# Patient Record
Sex: MALE | Race: WHITE | Employment: OTHER | URBAN - METROPOLITAN AREA
[De-identification: names, ages, dates, MRNs, and addresses within clinical notes are randomized per-mention and may not be internally consistent; named-entity substitution may affect disease eponyms.]

---

## 2019-03-01 ENCOUNTER — HOSPITAL ENCOUNTER (OUTPATIENT)
Dept: LAB | Age: 59
Discharge: HOME | End: 2019-03-01

## 2019-03-01 LAB — SENTARA SPECIMEN COL,SENBCF: NORMAL

## 2019-03-01 PROCEDURE — 99001 SPECIMEN HANDLING PT-LAB: CPT

## 2019-08-15 ENCOUNTER — HOSPITAL ENCOUNTER (OUTPATIENT)
Dept: LAB | Age: 59
Discharge: HOME | End: 2019-08-15

## 2019-08-15 LAB — SENTARA SPECIMEN COL,SENBCF: NORMAL

## 2019-08-15 PROCEDURE — 99001 SPECIMEN HANDLING PT-LAB: CPT

## 2019-12-24 ENCOUNTER — OFFICE VISIT (OUTPATIENT)
Dept: ORTHOPEDIC SURGERY | Facility: CLINIC | Age: 59
End: 2019-12-24

## 2019-12-24 VITALS
RESPIRATION RATE: 18 BRPM | DIASTOLIC BLOOD PRESSURE: 82 MMHG | WEIGHT: 208.4 LBS | BODY MASS INDEX: 28.23 KG/M2 | HEIGHT: 72 IN | HEART RATE: 71 BPM | SYSTOLIC BLOOD PRESSURE: 148 MMHG | OXYGEN SATURATION: 97 % | TEMPERATURE: 97.6 F

## 2019-12-24 DIAGNOSIS — M75.101 ROTATOR CUFF SYNDROME, RIGHT: Primary | ICD-10-CM

## 2019-12-24 NOTE — PROGRESS NOTES
Patient: Sofi Winter                MRN: [id number]       SSN: [id number]  YOB: 1960        AGE: 62 y.o. SEX: male  Body mass index is 28.26 kg/m². PCP: SKYLER Montoya  12/24/19    Chief Complaint: Right shoulder pain    HISTORY OF PRESENT ILLNESS:  Liston Dance is a 62year-old male who presents to the office today with right shoulder pain. He got right shoulder pain several weeks ago when lifting up a water heater. He started to have pain afterwards and noticed it when playing golf. It usually has gotten better in the past, but this has not gotten better. He has difficulty and pain when swinging a golf club, as well as when reaching above head. 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 Past Medical History:   Diagnosis Date    Anxiety     Hypertension     Injury of right hand 11/19/2012    Deep lacerations, thenar eminence    Microscopic hematuria     Prostate cancer ([facility] [address]     treated with seeds 2010, followed by XRT after ADT - Dr. Stephie Galvan       Family History   Family history unknown: Yes       Current Outpatient Medications   Medication Sig Dispense Refill    alfuzosin SR (UROXATRAL) 10 mg SR tablet Take 1 Tab by mouth daily (after dinner). 90 Tab 3    cholecalciferol, vitamin D3, (VITAMIN D3) 2,000 unit tab Take 2,000 Units by mouth daily.  losartan (COZAAR) 100 mg tablet Take 100 mg by mouth daily.  sertraline (ZOLOFT) 100 mg tablet Take  by mouth daily.          Allergies   Allergen Reactions    Iodinated Contrast Media Hives    Iodine Not Reported This Time       Past Surgical History:   Procedure Laterality Date    HX HERNIA REPAIR      HX KNEE ARTHROSCOPY      HX OTHER SURGICAL  09/2010    repair fistula w/ fibrin glue    HX UROLOGICAL      Seed imnplant, radiation     HX UROLOGICAL  9/21/2015    [facility], Dr. Feliz Franklin, Cystoscopy, Bilateral Retrograde, Biopsy of Bladder, Biopsy of Prostatic Urethra       Social History     Socioeconomic History    Marital status:      Spouse name: Not on file    Number of children: Not on file    Years of education: Not on file    Highest education level: Not on file   Occupational History    Not on file   Social Needs    Financial resource strain: Not on file    Food insecurity:     Worry: Not on file     Inability: Not on file    Transportation needs:     Medical: Not on file     Non-medical: Not on file   Tobacco Use    Smoking status: Never Smoker    Smokeless tobacco: Never Used   Substance and Sexual Activity    Alcohol use: Yes     Comment: Daily: 3-4 beers per day    Drug use: No    Sexual activity: Not on file   Lifestyle    Physical activity:     Days per week: Not on file     Minutes per session: Not on file    Stress: Not on file   Relationships    Social connections:     Talks on phone: Not on file     Gets together: Not on file     Attends Congregational service: Not on file     Active member of club or organization: Not on file     Attends meetings of clubs or organizations: Not on file     Relationship status: Not on file    Intimate partner violence:     Fear of current or ex partner: Not on file     Emotionally abused: Not on file     Physically abused: Not on file     Forced sexual activity: Not on file   Other Topics Concern    Not on file   Social History Narrative    Not on file       REVIEW OF SYSTEMS:      CON: negative for recent weight loss/gain, fever, or chills  EYE: negative for double or blurry vision  ENT: negative for hoarseness  RS:   negative for cough, URI, SOB  CV:  negative for chest pain, palpitations  GI:    negative for blood in stool, nausea/vomiting  :  negative for blood in urine  MS: As per HPI  Other systems reviewed and noted below.     PHYSICAL EXAMINATION:  Visit Vitals  /82   Pulse 71   Temp 97.6 °F (36.4 °C) (Oral)   Resp 18   Ht 6' (1.829 m)   Wt 208 lb 6.4 oz (94.5 kg)   SpO2 97% BMI 28.26 kg/m²     Body mass index is 28.26 kg/m². GENERAL: Alert and oriented x3, in no acute distress, well-developed, well-nourished. HEENT: Normocephalic, atraumatic. RESP: Non labored breathing with equal chest rise on inspiration. CV: Well perfused extremities. No cyanosis or clubbing noted. ABDOMEN: Soft, non-tender, non-distended. PHYSICAL EXAM:  Physical exam of the right shoulder with full range of motion, both active and passive. He has pain and weakness with supraspinatus and infraspinatus rotator cuff testing. Pain, but minimal weakness with biceps stress testing. Pain with impingement testing. No pain with belly press. He is neurovascularly intact distally. Nontender over the [facility] [place] joint. IMAGING:  X-rays of the right shoulder were reviewed. These are normal for any significant bony abnormalities. ASSESSMENT AND PLAN:   Liston Dance is a 62year-old male with right shoulder pain. The concern is for a rotator cuff injury based on his mechanism and activity level. I would like to get an MRI to further evaluate the soft tissues and see him back after that is completed.               Electronically signed by: Leidy Paul MD

## 2020-01-28 ENCOUNTER — OFFICE VISIT (OUTPATIENT)
Dept: ORTHOPEDIC SURGERY | Facility: CLINIC | Age: 60
End: 2020-01-28

## 2020-01-28 VITALS
TEMPERATURE: 97 F | BODY MASS INDEX: 28.71 KG/M2 | OXYGEN SATURATION: 97 % | SYSTOLIC BLOOD PRESSURE: 144 MMHG | WEIGHT: 212 LBS | DIASTOLIC BLOOD PRESSURE: 83 MMHG | HEART RATE: 70 BPM | HEIGHT: 72 IN | RESPIRATION RATE: 16 BRPM

## 2020-01-28 DIAGNOSIS — S46.011A TRAUMATIC INCOMPLETE TEAR OF RIGHT ROTATOR CUFF, INITIAL ENCOUNTER: Primary | ICD-10-CM

## 2020-01-28 RX ORDER — TRIAMCINOLONE ACETONIDE 40 MG/ML
40 INJECTION, SUSPENSION INTRA-ARTICULAR; INTRAMUSCULAR ONCE
Qty: 1 VIAL | Refills: 0
Start: 2020-01-28 | End: 2020-01-28

## 2020-01-28 NOTE — PROGRESS NOTES
1. Have you been to the ER, urgent care clinic since your last visit? Hospitalized since your last visit? No    2. Have you seen or consulted any other health care providers outside of the [address] [place] [address] since your last visit? Include any pap smears or colon screening.  No

## 2020-01-28 NOTE — PROGRESS NOTES
Patient: Naomi Uriostegui                MRN: [id number]       SSN: [id number]  YOB: 1960        AGE: 62 y.o. SEX: male  Body mass index is 28.75 kg/m². PCP: SKYLER Borden  01/28/20    Chief Complaint: Right shoulder MRI follow up    HISTORY OF PRESENT ILLNESS:  Dipak Peterson returns to the office today for his right shoulder. He continues to have right shoulder pain, no different than before. He has 5/10 pain. He has had his MRI done. He is here today to discuss the results. Past Medical History:   Diagnosis Date    Anxiety     Hypertension     Injury of right hand 11/19/2012    Deep lacerations, thenar eminence    Microscopic hematuria     Prostate cancer ([facility] [address]     treated with seeds 2010, followed by XRT after ADT - Dr. Christina Rodriguez       Family History   Family history unknown: Yes       Current Outpatient Medications   Medication Sig Dispense Refill    alfuzosin SR (UROXATRAL) 10 mg SR tablet Take 1 Tab by mouth daily (after dinner). 90 Tab 3    cholecalciferol, vitamin D3, (VITAMIN D3) 2,000 unit tab Take 2,000 Units by mouth daily.  losartan (COZAAR) 100 mg tablet Take 100 mg by mouth daily.  sertraline (ZOLOFT) 100 mg tablet Take  by mouth daily.          Allergies   Allergen Reactions    Iodinated Contrast Media Hives    Iodine Not Reported This Time       Past Surgical History:   Procedure Laterality Date    HX HERNIA REPAIR      HX KNEE ARTHROSCOPY      HX OTHER SURGICAL  09/2010    repair fistula w/ fibrin glue    HX UROLOGICAL      Seed imnplant, radiation     HX UROLOGICAL  9/21/2015    [facility], Dr. Wilbert Harding, Cystoscopy, Bilateral Retrograde, Biopsy of Bladder, Biopsy of Prostatic Urethra       Social History     Socioeconomic History    Marital status:      Spouse name: Not on file    Number of children: Not on file    Years of education: Not on file    Highest education level: Not on file   Occupational History    Not on file   Social Needs    Financial resource strain: Not on file    Food insecurity:     Worry: Not on file     Inability: Not on file    Transportation needs:     Medical: Not on file     Non-medical: Not on file   Tobacco Use    Smoking status: Never Smoker    Smokeless tobacco: Never Used   Substance and Sexual Activity    Alcohol use: Yes     Comment: Daily: 3-4 beers per day    Drug use: No    Sexual activity: Not on file   Lifestyle    Physical activity:     Days per week: Not on file     Minutes per session: Not on file    Stress: Not on file   Relationships    Social connections:     Talks on phone: Not on file     Gets together: Not on file     Attends Moravian service: Not on file     Active member of club or organization: Not on file     Attends meetings of clubs or organizations: Not on file     Relationship status: Not on file    Intimate partner violence:     Fear of current or ex partner: Not on file     Emotionally abused: Not on file     Physically abused: Not on file     Forced sexual activity: Not on file   Other Topics Concern    Not on file   Social History Narrative    Not on file       REVIEW OF SYSTEMS:      No changes from previous review of systems unless noted. PHYSICAL EXAMINATION:  Visit Vitals  /83   Pulse 70   Temp 97 °F (36.1 °C) (Oral)   Resp 16   Ht 6' (1.829 m)   Wt 212 lb (96.2 kg)   SpO2 97%   BMI 28.75 kg/m²     Body mass index is 28.75 kg/m². GENERAL: Alert and oriented x3, in no acute distress. HEENT: Normocephalic, atraumatic. RESP: Non labored breathing. SKIN: No rashes or lesions noted. PHYSICAL EXAM:  Right shoulder with full range of motion. Breakaway pain and weakness with supraspinatus and infraspinatus rotator cuff testing. No significant pain with belly press testing. He does have some pain with biceps stress testing. He is neurovascularly intact distally.       IMAGING:  An MRI was reviewed of his right shoulder, which shows a high grade partial thickness with possibly one area of full thickness supraspinatus rotator cuff and infraspinatus rotator cuff tearing. ASSESSMENT AND PLAN:   I had a lengthy discussion with Ashli Aguilar today regarding his treatment options. I certainly think he is a candidate for surgery in the form of an arthroscopic rotator cuff repair. He is very active with his business and would like to try conservative management and exhaust it before going to surgery, which I think is reasonable with a partial thickness rotator cuff tear. Therefore, we will try a cortisone shot and physical therapy today. I will see him back in about six weeks to see how he is doing.        [facility]  OFFICE PROCEDURE PROGRESS NOTE        Chart reviewed for the following:   Marilyn Andrea MD, have reviewed the History, Physical and updated the Allergic reactions for [address] [place] [address] performed immediately prior to start of procedure:   Marilyn Andrea MD, have performed the following reviews on [facility] prior to the start of the procedure:            * Patient was identified by name and date of birth   * Agreement on procedure being performed was verified  * Risks and Benefits explained to the patient  * Procedure site verified and marked as necessary  * Patient was positioned for comfort  * Consent was signed and verified    Time: 0820      Date of procedure: 1/28/2020    Procedure performed by:  Marquita Douglas MD    Provider assisted by: Pennie White LPN    Patient assisted by: self    How tolerated by patient: tolerated the procedure well with no complications    Post Procedural Pain Scale: 0 - No Hurt    Comments: none                  Electronically signed by: Marquita Douglas MD

## 2020-01-29 DIAGNOSIS — M75.101 ROTATOR CUFF SYNDROME, RIGHT: ICD-10-CM

## 2020-06-09 ENCOUNTER — HOSPITAL ENCOUNTER (OUTPATIENT)
Dept: LAB | Age: 60
Discharge: HOME | End: 2020-06-09

## 2020-06-09 LAB — SENTARA SPECIMEN COL,SENBCF: NORMAL

## 2020-06-09 PROCEDURE — 99001 SPECIMEN HANDLING PT-LAB: CPT

## 2020-06-19 ENCOUNTER — HOSPITAL ENCOUNTER (OUTPATIENT)
Dept: ULTRASOUND IMAGING | Age: 60
Discharge: HOME | End: 2020-06-19
Attending: NURSE PRACTITIONER
Payer: COMMERCIAL

## 2020-06-19 DIAGNOSIS — K40.90 RIGHT INGUINAL HERNIA: ICD-10-CM

## 2020-06-19 PROCEDURE — 76882 US LMTD JT/FCL EVL NVASC XTR: CPT

## 2020-06-19 PROCEDURE — 76857 US EXAM PELVIC LIMITED: CPT

## 2020-06-22 ENCOUNTER — OFFICE VISIT (OUTPATIENT)
Dept: SURGERY | Age: 60
End: 2020-06-22

## 2020-06-22 VITALS
RESPIRATION RATE: 18 BRPM | WEIGHT: 204 LBS | OXYGEN SATURATION: 97 % | HEIGHT: 72 IN | SYSTOLIC BLOOD PRESSURE: 142 MMHG | TEMPERATURE: 97.2 F | BODY MASS INDEX: 27.63 KG/M2 | HEART RATE: 74 BPM | DIASTOLIC BLOOD PRESSURE: 82 MMHG

## 2020-06-22 DIAGNOSIS — K40.90 RIGHT INGUINAL HERNIA: Primary | ICD-10-CM

## 2020-06-22 NOTE — PROGRESS NOTES
Chief Complaint   Patient presents with    Possible Hernia     pt referred by JOSH Zabala for inguinal hernia. Pt c/o hernia x 5 months states \"the last 3 weeks has been hurting and protruding out and feels like the size of a tennis ball. \"

## 2020-06-22 NOTE — PROGRESS NOTES
General Surgery Consult      Ashleigh Mattson  Admit date: (Not on file)    MRN: [id number]     : 1960     Age: 62 y.o. Attending Physician: Marleny Claire MD, [facility]      History of Present Illness:     Ashleigh Mattson is a 62 y.o. male who was referred to me for evaluation of a symptomatic right inguinal hernia. The patient has stated that he work in the [employer] work and he does a lot of heavy lifting and about 5 months ago he did sudden lifting and he felt sudden onset of pain and bulge in the right groin. He said that the bulge will come and goes until recently started coming out and not going in until he goes to bed to sleep and wake up in the morning with the bulge reduced. He said that the pain is more like discomfort and severe pain. He denies any nausea or vomiting or any change in bowel habits . He stated that he had a supraumbilical/ventral hernia repair in the past more than 20 years ago and he is not sure if a mesh was placed.     Patient Active Problem List    Diagnosis Date Noted    Hematuria 2015    Complex renal cyst 2015    Abnormal  x-ray 2015    Prostate cancer ([facility] [address] 2013    Lower urinary tract symptoms (LUTS) 2013    Laceration of wrist, right, complicated 5312     Past Medical History:   Diagnosis Date    Anxiety     Hypertension     Injury of right hand 2012    Deep lacerations, thenar eminence    Microscopic hematuria     Prostate cancer ([address]     treated with seeds , followed by XRT after ADT - Dr. German Mondragon      Past Surgical History:   Procedure Laterality Date    HX HERNIA REPAIR      HX KNEE ARTHROSCOPY      HX OTHER SURGICAL  2010    repair fistula w/ fibrin glue    HX UROLOGICAL      Seed imnplant, radiation     HX UROLOGICAL  2015    [facility], Dr. Farshad Kovacs, Cystoscopy, Bilateral Retrograde, Biopsy of Bladder, Biopsy of Prostatic Urethra      Social History     Tobacco Use    Smoking status: Never Smoker    Smokeless tobacco: Never Used   Substance Use Topics    Alcohol use: Yes     Comment: Daily: 3-4 beers per day      Social History     Tobacco Use   Smoking Status Never Smoker   Smokeless Tobacco Never Used     Family History   Family history unknown: Yes      Current Outpatient Medications   Medication Sig    alfuzosin SR (UROXATRAL) 10 mg SR tablet Take 1 Tab by mouth daily (after dinner).  cholecalciferol, vitamin D3, (VITAMIN D3) 2,000 unit tab Take 2,000 Units by mouth daily.  losartan (COZAAR) 100 mg tablet Take 100 mg by mouth daily.  sertraline (ZOLOFT) 100 mg tablet Take  by mouth daily. No current facility-administered medications for this visit. Allergies   Allergen Reactions    Iodinated Contrast Media Hives    Iodine Not Reported This Time        Review of Systems:  Constitutional: negative  Eyes: negative  Ears, Nose, Mouth, Throat, and Face: negative  Respiratory: negative  Cardiovascular: negative  Gastrointestinal: positive for abdominal pain and Right groin bulge and pain  Genitourinary:negative  Integument/Breast: negative  Hematologic/Lymphatic: negative  Musculoskeletal:negative  Neurological: negative  Behavioral/Psychiatric: negative  Endocrine: negative  Allergic/Immunologic: negative    Objective:     Visit Vitals  /82   Pulse 74   Temp 97.2 °F (36.2 °C)   Resp 18   Ht 6' (1.829 m)   Wt 92.5 kg (204 lb)   SpO2 97%   BMI 27.67 kg/m²       Physical Exam:      General:  in no apparent distress, well developed and well nourished, alert, oriented times 3 and afebrile   Eyes:  conjunctivae and sclerae normal, pupils equal, round, reactive to light   Throat & Neck: no erythema or exudates noted and neck supple and symmetrical; no palpable masses   Lungs:   clear to auscultation bilaterally   Heart:  Regular rate and rhythm   Abdomen:   rounded, soft, nontender, nondistended, no masses or organomegaly.   There is a supraumbilical midline scar about 3 cm long consistent with his previous ventral hernia repair. There is a right inguinal hernia that is nontender and easily reducible. Extremities: extremities normal, atraumatic, no cyanosis or edema   Skin: Normal.       Imaging and Lab Review:     CBC:   Lab Results   Component Value Date/Time    WBC 7.9 09/16/2015 10:25 AM    RBC 4.08 (L) 09/16/2015 10:25 AM    HGB 13.4 09/16/2015 10:25 AM    HCT 39.7 09/16/2015 10:25 AM    PLATELET 294 49/55/3866 10:25 AM     BMP:   Lab Results   Component Value Date/Time    Glucose 82 09/16/2015 10:25 AM    Sodium 142 09/16/2015 10:25 AM    Potassium 4.3 09/16/2015 10:25 AM    Chloride 107 09/16/2015 10:25 AM    CO2 28 09/16/2015 10:25 AM    BUN 23 (H) 09/16/2015 10:25 AM    Creatinine 0.83 09/16/2015 10:25 AM    Calcium 8.7 09/16/2015 10:25 AM     CMP:  Lab Results   Component Value Date/Time    Glucose 82 09/16/2015 10:25 AM    Sodium 142 09/16/2015 10:25 AM    Potassium 4.3 09/16/2015 10:25 AM    Chloride 107 09/16/2015 10:25 AM    CO2 28 09/16/2015 10:25 AM    BUN 23 (H) 09/16/2015 10:25 AM    Creatinine 0.83 09/16/2015 10:25 AM    Calcium 8.7 09/16/2015 10:25 AM    Anion gap 7 09/16/2015 10:25 AM    BUN/Creatinine ratio 28 (H) 09/16/2015 10:25 AM    Alk. phosphatase 62 09/16/2015 10:25 AM    Protein, total 6.9 09/16/2015 10:25 AM    Albumin 3.8 09/16/2015 10:25 AM    Globulin 3.1 09/16/2015 10:25 AM    A-G Ratio 1.2 09/16/2015 10:25 AM       No results found for this or any previous visit (from the past 24 hour(s)). images and reports reviewed    Assessment:   Jessie Salas is a 62 y.o. male is presenting with a picture of symptomatic right inguinal hernia. I Discussed the possibility of incarceration, strangulation, enlargement in size over time, and the risk of emergency surgery in the face of strangulation. I also discussed the use of prosthetic materials (mesh), including the risk of infection.  Also discussed the risk of surgery including recurrence and the possible need for reoperation and removal of mesh if used, possibility of postoperative small bowel injury, obstruction or ileus, and the risks of general anesthetic. I explained to the the patient about the robotic hernia repair procedure. Plan:     1. Schedule for robotic right inguinal hernia repair with placement of mesh. 2. No heavy lifting for 2 weeks after the surgery (More than 15 pounds)  3. Avoid constipation by taking stool softener.     Please call me if you have any questions (cell phone: [phone])     Signed By: Vinay Preciado MD     June 22, 2020

## 2020-07-17 ENCOUNTER — HOSPITAL ENCOUNTER (OUTPATIENT)
Dept: PREADMISSION TESTING | Age: 60
Discharge: HOME | End: 2020-07-17
Payer: COMMERCIAL

## 2020-07-17 PROCEDURE — 87635 SARS-COV-2 COVID-19 AMP PRB: CPT

## 2020-07-18 LAB — SARS-COV-2, COV2NT: NOT DETECTED

## 2020-07-21 ENCOUNTER — ANESTHESIA EVENT (OUTPATIENT)
Dept: SURGERY | Age: 60
End: 2020-07-21
Payer: COMMERCIAL

## 2020-07-22 ENCOUNTER — HOSPITAL ENCOUNTER (OUTPATIENT)
Age: 60
Setting detail: OUTPATIENT SURGERY
Discharge: HOME | End: 2020-07-22
Attending: SURGERY | Admitting: SURGERY
Payer: COMMERCIAL

## 2020-07-22 ENCOUNTER — ANESTHESIA (OUTPATIENT)
Dept: SURGERY | Age: 60
End: 2020-07-22
Payer: COMMERCIAL

## 2020-07-22 VITALS
HEART RATE: 59 BPM | BODY MASS INDEX: 27.36 KG/M2 | DIASTOLIC BLOOD PRESSURE: 82 MMHG | TEMPERATURE: 97.4 F | SYSTOLIC BLOOD PRESSURE: 131 MMHG | HEIGHT: 72 IN | RESPIRATION RATE: 16 BRPM | WEIGHT: 202 LBS | OXYGEN SATURATION: 99 %

## 2020-07-22 DIAGNOSIS — Z87.19 S/P HERNIA REPAIR: Primary | ICD-10-CM

## 2020-07-22 DIAGNOSIS — Z98.890 S/P HERNIA REPAIR: Primary | ICD-10-CM

## 2020-07-22 PROCEDURE — 77030031139 HC SUT VCRL2 J&J -A: Performed by: SURGERY

## 2020-07-22 PROCEDURE — 77030020703 HC SEAL CANN DISP INTU -B: Performed by: SURGERY

## 2020-07-22 PROCEDURE — 74011000250 HC RX REV CODE- 250: Performed by: NURSE ANESTHETIST, CERTIFIED REGISTERED

## 2020-07-22 PROCEDURE — 74011250636 HC RX REV CODE- 250/636: Performed by: NURSE ANESTHETIST, CERTIFIED REGISTERED

## 2020-07-22 PROCEDURE — 74011250636 HC RX REV CODE- 250/636: Performed by: SURGERY

## 2020-07-22 PROCEDURE — 77030018706 HC CORD MPLR COVD -A: Performed by: SURGERY

## 2020-07-22 PROCEDURE — 77030039266 HC ADH SKN EXOFIN S2SG -A: Performed by: SURGERY

## 2020-07-22 PROCEDURE — 76210000006 HC OR PH I REC 0.5 TO 1 HR: Performed by: SURGERY

## 2020-07-22 PROCEDURE — 74011250637 HC RX REV CODE- 250/637: Performed by: NURSE ANESTHETIST, CERTIFIED REGISTERED

## 2020-07-22 PROCEDURE — 77030002933 HC SUT MCRYL J&J -A: Performed by: SURGERY

## 2020-07-22 PROCEDURE — 77030022704 HC SUT VLOC COVD -B: Performed by: SURGERY

## 2020-07-22 PROCEDURE — 76210000021 HC REC RM PH II 0.5 TO 1 HR: Performed by: SURGERY

## 2020-07-22 PROCEDURE — C1781 MESH (IMPLANTABLE): HCPCS | Performed by: SURGERY

## 2020-07-22 PROCEDURE — 77030035277 HC OBTRTR BLDELSS DISP INTU -B: Performed by: SURGERY

## 2020-07-22 PROCEDURE — 76060000032 HC ANESTHESIA 0.5 TO 1 HR: Performed by: SURGERY

## 2020-07-22 PROCEDURE — 74011250636 HC RX REV CODE- 250/636

## 2020-07-22 PROCEDURE — 77030040361 HC SLV COMPR DVT MDII -B: Performed by: SURGERY

## 2020-07-22 PROCEDURE — 77030018836 HC SOL IRR NACL ICUM -A: Performed by: SURGERY

## 2020-07-22 PROCEDURE — 77030003578 HC NDL INSUF VERES AMR -B: Performed by: SURGERY

## 2020-07-22 PROCEDURE — 77030040922 HC BLNKT HYPOTHRM STRY -A: Performed by: SURGERY

## 2020-07-22 PROCEDURE — 76010000933 HC OR TIME 0.5 TO 1HR INTENSV - TIER 2: Performed by: SURGERY

## 2020-07-22 DEVICE — MESH HERN W10XL15CM POLY POLYLACTIC ACID 70% CLLGN 30% GLYC: Type: IMPLANTABLE DEVICE | Site: ABDOMEN | Status: FUNCTIONAL

## 2020-07-22 RX ORDER — NEOSTIGMINE METHYLSULFATE 1 MG/ML
INJECTION, SOLUTION INTRAVENOUS AS NEEDED
Status: DISCONTINUED | OUTPATIENT
Start: 2020-07-22 | End: 2020-07-22 | Stop reason: HOSPADM

## 2020-07-22 RX ORDER — SODIUM CHLORIDE 0.9 % (FLUSH) 0.9 %
5-40 SYRINGE (ML) INJECTION AS NEEDED
Status: DISCONTINUED | OUTPATIENT
Start: 2020-07-22 | End: 2020-07-22 | Stop reason: HOSPADM

## 2020-07-22 RX ORDER — FENTANYL CITRATE 50 UG/ML
INJECTION, SOLUTION INTRAMUSCULAR; INTRAVENOUS AS NEEDED
Status: DISCONTINUED | OUTPATIENT
Start: 2020-07-22 | End: 2020-07-22 | Stop reason: HOSPADM

## 2020-07-22 RX ORDER — DEXAMETHASONE SODIUM PHOSPHATE 4 MG/ML
INJECTION, SOLUTION INTRA-ARTICULAR; INTRALESIONAL; INTRAMUSCULAR; INTRAVENOUS; SOFT TISSUE AS NEEDED
Status: DISCONTINUED | OUTPATIENT
Start: 2020-07-22 | End: 2020-07-22 | Stop reason: HOSPADM

## 2020-07-22 RX ORDER — HYDROCODONE BITARTRATE AND ACETAMINOPHEN 5; 325 MG/1; MG/1
1 TABLET ORAL ONCE
Status: COMPLETED | OUTPATIENT
Start: 2020-07-22 | End: 2020-07-22

## 2020-07-22 RX ORDER — GLYCOPYRROLATE 0.2 MG/ML
INJECTION INTRAMUSCULAR; INTRAVENOUS AS NEEDED
Status: DISCONTINUED | OUTPATIENT
Start: 2020-07-22 | End: 2020-07-22 | Stop reason: HOSPADM

## 2020-07-22 RX ORDER — ONDANSETRON 2 MG/ML
4 INJECTION INTRAMUSCULAR; INTRAVENOUS ONCE
Status: DISCONTINUED | OUTPATIENT
Start: 2020-07-22 | End: 2020-07-22 | Stop reason: HOSPADM

## 2020-07-22 RX ORDER — SODIUM CHLORIDE 0.9 % (FLUSH) 0.9 %
5-40 SYRINGE (ML) INJECTION EVERY 8 HOURS
Status: DISCONTINUED | OUTPATIENT
Start: 2020-07-22 | End: 2020-07-22 | Stop reason: HOSPADM

## 2020-07-22 RX ORDER — HYDROMORPHONE HYDROCHLORIDE 2 MG/ML
0.5 INJECTION, SOLUTION INTRAMUSCULAR; INTRAVENOUS; SUBCUTANEOUS
Status: DISCONTINUED | OUTPATIENT
Start: 2020-07-22 | End: 2020-07-22 | Stop reason: HOSPADM

## 2020-07-22 RX ORDER — FENTANYL CITRATE 50 UG/ML
50 INJECTION, SOLUTION INTRAMUSCULAR; INTRAVENOUS AS NEEDED
Status: DISCONTINUED | OUTPATIENT
Start: 2020-07-22 | End: 2020-07-22 | Stop reason: HOSPADM

## 2020-07-22 RX ORDER — ROCURONIUM BROMIDE 10 MG/ML
INJECTION, SOLUTION INTRAVENOUS AS NEEDED
Status: DISCONTINUED | OUTPATIENT
Start: 2020-07-22 | End: 2020-07-22 | Stop reason: HOSPADM

## 2020-07-22 RX ORDER — ONDANSETRON 2 MG/ML
INJECTION INTRAMUSCULAR; INTRAVENOUS AS NEEDED
Status: DISCONTINUED | OUTPATIENT
Start: 2020-07-22 | End: 2020-07-22 | Stop reason: HOSPADM

## 2020-07-22 RX ORDER — INSULIN LISPRO 100 [IU]/ML
INJECTION, SOLUTION INTRAVENOUS; SUBCUTANEOUS ONCE
Status: DISCONTINUED | OUTPATIENT
Start: 2020-07-22 | End: 2020-07-22 | Stop reason: HOSPADM

## 2020-07-22 RX ORDER — MIDAZOLAM HYDROCHLORIDE 1 MG/ML
INJECTION, SOLUTION INTRAMUSCULAR; INTRAVENOUS AS NEEDED
Status: DISCONTINUED | OUTPATIENT
Start: 2020-07-22 | End: 2020-07-22 | Stop reason: HOSPADM

## 2020-07-22 RX ORDER — OXYCODONE AND ACETAMINOPHEN 5; 325 MG/1; MG/1
1 TABLET ORAL
Qty: 24 TAB | Refills: 0 | Status: SHIPPED | OUTPATIENT
Start: 2020-07-22 | End: 2020-07-25

## 2020-07-22 RX ORDER — LIDOCAINE HYDROCHLORIDE 20 MG/ML
INJECTION, SOLUTION EPIDURAL; INFILTRATION; INTRACAUDAL; PERINEURAL AS NEEDED
Status: DISCONTINUED | OUTPATIENT
Start: 2020-07-22 | End: 2020-07-22 | Stop reason: HOSPADM

## 2020-07-22 RX ORDER — CEFAZOLIN SODIUM 2 G/50ML
2 SOLUTION INTRAVENOUS
Status: COMPLETED | OUTPATIENT
Start: 2020-07-22 | End: 2020-07-22

## 2020-07-22 RX ORDER — LIDOCAINE HYDROCHLORIDE 10 MG/ML
0.1 INJECTION, SOLUTION EPIDURAL; INFILTRATION; INTRACAUDAL; PERINEURAL AS NEEDED
Status: DISCONTINUED | OUTPATIENT
Start: 2020-07-22 | End: 2020-07-22 | Stop reason: HOSPADM

## 2020-07-22 RX ORDER — KETOROLAC TROMETHAMINE 15 MG/ML
INJECTION, SOLUTION INTRAMUSCULAR; INTRAVENOUS AS NEEDED
Status: DISCONTINUED | OUTPATIENT
Start: 2020-07-22 | End: 2020-07-22 | Stop reason: HOSPADM

## 2020-07-22 RX ORDER — PROPOFOL 10 MG/ML
INJECTION, EMULSION INTRAVENOUS AS NEEDED
Status: DISCONTINUED | OUTPATIENT
Start: 2020-07-22 | End: 2020-07-22 | Stop reason: HOSPADM

## 2020-07-22 RX ORDER — SODIUM CHLORIDE, SODIUM LACTATE, POTASSIUM CHLORIDE, CALCIUM CHLORIDE 600; 310; 30; 20 MG/100ML; MG/100ML; MG/100ML; MG/100ML
75 INJECTION, SOLUTION INTRAVENOUS CONTINUOUS
Status: DISCONTINUED | OUTPATIENT
Start: 2020-07-22 | End: 2020-07-22 | Stop reason: HOSPADM

## 2020-07-22 RX ORDER — SODIUM CHLORIDE, SODIUM LACTATE, POTASSIUM CHLORIDE, CALCIUM CHLORIDE 600; 310; 30; 20 MG/100ML; MG/100ML; MG/100ML; MG/100ML
50 INJECTION, SOLUTION INTRAVENOUS CONTINUOUS
Status: DISCONTINUED | OUTPATIENT
Start: 2020-07-22 | End: 2020-07-22 | Stop reason: HOSPADM

## 2020-07-22 RX ORDER — SUCCINYLCHOLINE CHLORIDE 20 MG/ML
INJECTION INTRAMUSCULAR; INTRAVENOUS AS NEEDED
Status: DISCONTINUED | OUTPATIENT
Start: 2020-07-22 | End: 2020-07-22 | Stop reason: HOSPADM

## 2020-07-22 RX ADMIN — MIDAZOLAM HYDROCHLORIDE 2 MG: 2 INJECTION, SOLUTION INTRAMUSCULAR; INTRAVENOUS at 07:19

## 2020-07-22 RX ADMIN — ONDANSETRON 4 MG: 2 INJECTION INTRAMUSCULAR; INTRAVENOUS at 07:58

## 2020-07-22 RX ADMIN — KETOROLAC TROMETHAMINE 15 MG: 15 INJECTION, SOLUTION INTRAMUSCULAR; INTRAVENOUS at 08:00

## 2020-07-22 RX ADMIN — PROPOFOL 170 MG: 10 INJECTION, EMULSION INTRAVENOUS at 07:24

## 2020-07-22 RX ADMIN — LIDOCAINE HYDROCHLORIDE 80 MG: 20 INJECTION, SOLUTION EPIDURAL; INFILTRATION; INTRACAUDAL; PERINEURAL at 07:24

## 2020-07-22 RX ADMIN — FAMOTIDINE 20 MG: 10 INJECTION, SOLUTION INTRAVENOUS at 06:33

## 2020-07-22 RX ADMIN — HYDROCODONE BITARTRATE AND ACETAMINOPHEN 1 TABLET: 5; 325 TABLET ORAL at 08:45

## 2020-07-22 RX ADMIN — GLYCOPYRROLATE 0.6 MG: 0.2 INJECTION INTRAMUSCULAR; INTRAVENOUS at 08:01

## 2020-07-22 RX ADMIN — FENTANYL CITRATE 100 MCG: 50 INJECTION, SOLUTION INTRAMUSCULAR; INTRAVENOUS at 07:24

## 2020-07-22 RX ADMIN — CEFAZOLIN SODIUM 2 G: 2 SOLUTION INTRAVENOUS at 07:30

## 2020-07-22 RX ADMIN — Medication 3 MG: at 08:01

## 2020-07-22 RX ADMIN — ROCURONIUM BROMIDE 30 MG: 50 INJECTION INTRAVENOUS at 07:29

## 2020-07-22 RX ADMIN — SUCCINYLCHOLINE CHLORIDE 140 MG: 20 INJECTION, SOLUTION INTRAMUSCULAR; INTRAVENOUS at 07:25

## 2020-07-22 RX ADMIN — SODIUM CHLORIDE, POTASSIUM CHLORIDE, SODIUM LACTATE AND CALCIUM CHLORIDE 75 ML/HR: 600; 310; 30; 20 INJECTION, SOLUTION INTRAVENOUS at 06:35

## 2020-07-22 RX ADMIN — DEXAMETHASONE SODIUM PHOSPHATE 4 MG: 4 INJECTION, SOLUTION INTRAMUSCULAR; INTRAVENOUS at 07:27

## 2020-07-22 NOTE — ANESTHESIA PREPROCEDURE EVALUATION
Anesthetic History   No history of anesthetic complications            Review of Systems / Medical History  Patient summary reviewed, nursing notes reviewed and pertinent labs reviewed    Pulmonary  Within defined limits                 Neuro/Psych   Within defined limits           Cardiovascular    Hypertension              Exercise tolerance: >4 METS     GI/Hepatic/Renal  Within defined limits              Endo/Other  Within defined limits           Other Findings              Physical Exam    Airway  Mallampati: I  TM Distance: 4 - 6 cm  Neck ROM: normal range of motion   Mouth opening: Normal     Cardiovascular    Rhythm: regular  Rate: normal         Dental  No notable dental hx       Pulmonary  Breath sounds clear to auscultation               Abdominal  GI exam deferred       Other Findings            Anesthetic Plan    ASA: 3  Anesthesia type: general          Induction: Intravenous  Anesthetic plan and risks discussed with: Patient

## 2020-07-22 NOTE — OP NOTES
[facility]  OPERATIVE REPORT    Name:  Michael Leos  MR#:   [id number]  :  1960  ACCOUNT #:  [de-identified]  DATE OF SERVICE:  2020    PREOPERATIVE DIAGNOSIS:  Right inguinal hernia. POSTOPERATIVE DIAGNOSIS:  Right large indirect inguinal hernia. PROCEDURE PERFORMED:  Robotic repair of right inguinal hernia with placement of mesh. SURGEON:  Lurdes Sheth. Keshawn Gresham MD.    ASSISTANT:  Naomi Rodriges3:  General.    COMPLICATIONS:  None. SPECIMENS REMOVED:  None. IMPLANTS:  ProGrip Covidien mesh 4 x 6 inches. ESTIMATED BLOOD LOSS:  Minimal.    DESCRIPTION OF PROCEDURE:  The patient was brought to the operating room. Anesthesia was induced. Scrubbing and draping of the abdomen were done in the usual manner. A timeout was performed. A skin incision in the left upper quadrant was performed because the patient had a previous open ventral hernia repair. Veress needle was inserted. Saline drop test was performed. Abdomen was insufflated. An 8 mm port was placed on the right side of the abdominal wall and exploration of the abdomen revealed no adhesions. At this point, two other 8 mm ports, one placed in the supraumbilical area at the site of the previous ventral hernia repair and one in the left upper quadrant, that were placed under direct visualization. There was a right indirect inguinal hernia that seemed to be large, and at this point, the patient was placed in Trendelenburg position. The robot was docked. The peritoneum was opened in the right groin. The preperitoneal space was dissected. The inferior epigastric vessels were identified and protected. The hernia sac was completely dissected. The cord structures including the vas were identified and protected. At this point, the Isreal's ligament was also identified and seen.   I placed a ProGrip Covidien mesh 4 x 6 inches in the preperitoneal space with the rough side towards the abdominal wall and the smooth side towards the bowel. This was fixed with interrupted 2-0 Vicryl sutures at three places, two on the upper part and one at the Isreal's ligament. Then, the peritoneum was closed on top of the mesh with a running 2-0 V-Loc suture to cover the mesh. Hemostasis was secured. Instruments were removed. The robot was undocked, and the skin incisions were closed with 4-0 Monocryl and glue.       Chanelle Newman MD      [id number]  D:  07/22/2020 8:06  T:  07/22/2020 14:55  JOB #:  [id number]

## 2020-07-22 NOTE — ANESTHESIA POSTPROCEDURE EVALUATION
Procedure(s):  ROBOTIC RIGHT INGUINAL HERNIA REPAIR WITH MESH. general    Anesthesia Post Evaluation      Multimodal analgesia: multimodal analgesia used between 6 hours prior to anesthesia start to PACU discharge  Patient location during evaluation: bedside  Patient participation: complete - patient participated  Level of consciousness: awake  Pain management: adequate  Airway patency: patent  Anesthetic complications: no  Cardiovascular status: stable  Respiratory status: acceptable  Hydration status: acceptable  Post anesthesia nausea and vomiting:  controlled      INITIAL Post-op Vital signs:   Vitals Value Taken Time   /82 7/22/2020  8:30 AM   Temp 36.7 °C (98 °F) 7/22/2020  8:10 AM   Pulse 60 7/22/2020  8:33 AM   Resp 10 7/22/2020  8:33 AM   SpO2 99 % 7/22/2020  8:33 AM   Vitals shown include unvalidated device data.

## 2020-07-22 NOTE — PROGRESS NOTES
Date of Surgery Update:  Christen Mcmanus was seen and examined. History and physical has been reviewed. The patient has been examined. There have been no significant clinical changes since the completion of the originally dated History and Physical.  We will proceed with robotic right inguinal hernia repair with mesh.      Signed By: Winter Hallman MD     July 22, 2020 6:28 AM

## 2020-07-22 NOTE — BRIEF OP NOTE
Brief Postoperative Note    Patient: Sofi Winter  YOB: 1960  MRN: [id number]    Date of Procedure: 7/22/2020     Pre-Op Diagnosis: K40.90 RIGHT INGUINAL HERNIA    Post-Op Diagnosis: Same as preoperative diagnosis. Procedure(s):  ROBOTIC RIGHT INGUINAL HERNIA REPAIR WITH MESH    Surgeon(s):  Susanne Dorman MD    Surgical Assistant: None    Anesthesia: General     Estimated Blood Loss (mL): Minimal    Complications: None    Specimens: * No specimens in log *     Implants:   Implant Name Type Inv. Item Serial No.  Lot No. LRB No. Used Action   MESH ABSORB SURG PROGRIP 10X15 -- PROGRIP - [id number]  MESH ABSORB SURG PROGRIP 10X15 -- PROGRIP  COVIDIEN  SURGICAL [id number] N/A 1 Implanted       Drains: * No LDAs found *    Findings: Right indirect inguinal hernia.      Electronically Signed by Satya Peterson MD on 7/22/2020 at 8:02 AM

## 2020-07-22 NOTE — PROGRESS NOTES
conducted a pre-surgery visit with Starla Duke, who is a 62 y.o.,male. The  provided the following Interventions:  Initiated a relationship of care and support. Plan:  Chaplains will continue to follow and will provide pastoral care on an as needed/requested basis.  recommends bedside caregivers page  on duty if patient shows signs of acute spiritual or emotional distress.     [address]  [phone]

## 2020-08-05 ENCOUNTER — OFFICE VISIT (OUTPATIENT)
Dept: SURGERY | Age: 60
End: 2020-08-05

## 2020-08-05 VITALS
WEIGHT: 209 LBS | BODY MASS INDEX: 28.31 KG/M2 | HEIGHT: 72 IN | HEART RATE: 63 BPM | SYSTOLIC BLOOD PRESSURE: 131 MMHG | DIASTOLIC BLOOD PRESSURE: 74 MMHG | OXYGEN SATURATION: 98 % | TEMPERATURE: 97.6 F

## 2020-08-05 DIAGNOSIS — Z09 POSTOPERATIVE EXAMINATION: Primary | ICD-10-CM

## 2020-08-05 NOTE — PROGRESS NOTES
Tereza Bailey is a 62 y.o. male (: 1960) presenting to address:    Chief Complaint   Patient presents with    Surgical Follow-up     Right inguinal hernia repair with 4x6 mesh done 20       Medication list and allergies have been reviewed with eTreza Bailey and updated as of today's date. I have gone over all Medical, Surgical and Social History with Tereza Bailey and updated/added the information accordingly.

## 2020-08-05 NOTE — PROGRESS NOTES
Patient seen and examined  He is doing great from the standpoint of the surgery however he stated that since his surgery has been complaining of kind of similar symptoms to reflux but they are located in the left chest and they radiate to the shoulder and upper arm. He said that when he moves forward and he burps he feels better. His abdomen is soft and non-tender and his wounds are healing well. Right groin exam is normal.  I told the patient to call his PCP office today to discuss this with them to make sure it is not cardiac in origin. This has been happening with him for couple weeks I do not see the urgency to send him to the emergency room and he said he does not have the symptoms now.    Follow-up with me as needed

## 2020-09-15 ENCOUNTER — HOSPITAL ENCOUNTER (OUTPATIENT)
Dept: LAB | Age: 60
Discharge: HOME | End: 2020-09-15

## 2020-09-15 LAB — SENTARA SPECIMEN COL,SENBCF: NORMAL

## 2020-09-15 PROCEDURE — 99001 SPECIMEN HANDLING PT-LAB: CPT

## 2020-10-01 ENCOUNTER — HOSPITAL ENCOUNTER (OUTPATIENT)
Dept: NON INVASIVE DIAGNOSTICS | Age: 60
Discharge: HOME | End: 2020-10-01
Attending: NURSE PRACTITIONER
Payer: COMMERCIAL

## 2020-10-01 VITALS
BODY MASS INDEX: 28.31 KG/M2 | SYSTOLIC BLOOD PRESSURE: 144 MMHG | DIASTOLIC BLOOD PRESSURE: 80 MMHG | WEIGHT: 209 LBS | HEIGHT: 72 IN

## 2020-10-01 VITALS
SYSTOLIC BLOOD PRESSURE: 131 MMHG | HEIGHT: 72 IN | BODY MASS INDEX: 28.31 KG/M2 | WEIGHT: 209 LBS | DIASTOLIC BLOOD PRESSURE: 74 MMHG

## 2020-10-01 DIAGNOSIS — R07.9 CHEST PAIN, UNSPECIFIED: ICD-10-CM

## 2020-10-01 DIAGNOSIS — R07.9 CHEST PAIN, UNSPECIFIED TYPE: ICD-10-CM

## 2020-10-01 LAB
ECHO AO ROOT DIAM: 3.83 CM
ECHO LA AREA 4C: 24.12 CM2
ECHO LA VOL 2C: 84.96 ML (ref 18–58)
ECHO LA VOL 4C: 65.92 ML (ref 18–58)
ECHO LA VOL BP: 88.39 ML (ref 18–58)
ECHO LA VOL/BSA BIPLANE: 40.72 ML/M2 (ref 16–28)
ECHO LA VOLUME INDEX A2C: 39.14 ML/M2 (ref 16–28)
ECHO LA VOLUME INDEX A4C: 30.37 ML/M2 (ref 16–28)
ECHO LV EDV A2C: 157.84 ML
ECHO LV EDV A4C: 159.27 ML
ECHO LV EDV BP: 158.78 ML (ref 67–155)
ECHO LV EDV INDEX A4C: 73.4 ML/M2
ECHO LV EDV INDEX BP: 73.1 ML/M2
ECHO LV EDV NDEX A2C: 72.7 ML/M2
ECHO LV EJECTION FRACTION A2C: 55 PERCENT
ECHO LV EJECTION FRACTION A4C: 54 PERCENT
ECHO LV EJECTION FRACTION BIPLANE: 53.3 PERCENT (ref 55–100)
ECHO LV ESV A2C: 70.85 ML
ECHO LV ESV A4C: 73.99 ML
ECHO LV ESV BP: 74.12 ML (ref 22–58)
ECHO LV ESV INDEX A2C: 32.6 ML/M2
ECHO LV ESV INDEX A4C: 34.1 ML/M2
ECHO LV ESV INDEX BP: 34.1 ML/M2
ECHO LV INTERNAL DIMENSION DIASTOLIC: 6.29 CM (ref 4.2–5.9)
ECHO LV INTERNAL DIMENSION SYSTOLIC: 4.29 CM
ECHO LV IVSD: 0.7 CM (ref 0.6–1)
ECHO LV MASS 2D: 180.8 G (ref 88–224)
ECHO LV MASS INDEX 2D: 83.3 G/M2 (ref 49–115)
ECHO LV POSTERIOR WALL DIASTOLIC: 0.76 CM (ref 0.6–1)
ECHO LVOT DIAM: 2 CM
ECHO LVOT PEAK GRADIENT: 5.44 MMHG
ECHO LVOT PEAK VELOCITY: 116.63 CM/S
ECHO LVOT SV: 79.3 ML
ECHO LVOT VTI: 25.32 CM
ECHO MV A VELOCITY: 85.17 CM/S
ECHO MV E DECELERATION TIME (DT): 0.24 S
ECHO MV E VELOCITY: 85.7 CM/S
ECHO MV E/A RATIO: 1.01
ECHO TV REGURGITANT MAX VELOCITY: 223.42 CM/S
ECHO TV REGURGITANT PEAK GRADIENT: 19.97 MMHG
LVOT MG: 2.59 MMHG
STRESS ANGINA INDEX: 0
STRESS BASELINE DIAS BP: 80 MMHG
STRESS BASELINE HR: 77 BPM
STRESS BASELINE SYS BP: 144 MMHG
STRESS ESTIMATED WORKLOAD: 9 METS
STRESS EXERCISE DUR MIN: NORMAL
STRESS PEAK DIAS BP: 88 MMHG
STRESS PEAK SYS BP: 198 MMHG
STRESS PERCENT HR ACHIEVED: 95 %
STRESS POST PEAK HR: 150 BPM
STRESS RATE PRESSURE PRODUCT: NORMAL BPM*MMHG
STRESS ST DEPRESSION: 0 MM
STRESS ST ELEVATION: 0 MM
STRESS TARGET HR: 158 BPM

## 2020-10-01 PROCEDURE — 93306 TTE W/DOPPLER COMPLETE: CPT | Performed by: INTERNAL MEDICINE

## 2020-10-01 PROCEDURE — 93017 CV STRESS TEST TRACING ONLY: CPT

## 2020-10-01 PROCEDURE — 93015 CV STRESS TEST SUPVJ I&R: CPT | Performed by: INTERNAL MEDICINE

## 2020-10-01 PROCEDURE — 93306 TTE W/DOPPLER COMPLETE: CPT

## 2020-10-27 ENCOUNTER — OFFICE VISIT (OUTPATIENT)
Dept: ORTHOPEDIC SURGERY | Age: 60
End: 2020-10-27
Payer: COMMERCIAL

## 2020-10-27 VITALS
SYSTOLIC BLOOD PRESSURE: 127 MMHG | HEIGHT: 72 IN | TEMPERATURE: 96.7 F | BODY MASS INDEX: 28.31 KG/M2 | WEIGHT: 209 LBS | DIASTOLIC BLOOD PRESSURE: 75 MMHG

## 2020-10-27 DIAGNOSIS — S46.011D TRAUMATIC INCOMPLETE TEAR OF RIGHT ROTATOR CUFF, SUBSEQUENT ENCOUNTER: Primary | ICD-10-CM

## 2020-10-27 DIAGNOSIS — S46.101A INJURY OF TENDON OF LONG HEAD OF RIGHT BICEPS, INITIAL ENCOUNTER: ICD-10-CM

## 2020-10-27 PROCEDURE — 99214 OFFICE O/P EST MOD 30 MIN: CPT | Performed by: ORTHOPAEDIC SURGERY

## 2020-10-27 RX ORDER — IBUPROFEN 800 MG/1
800 TABLET ORAL
Qty: 90 TAB | Refills: 2 | Status: SHIPPED | OUTPATIENT
Start: 2020-10-27 | End: 2021-12-09

## 2020-10-27 NOTE — PROGRESS NOTES
Patient: Ganesh Lorenzo                MRN: [id number]       SSN: [id number]  YOB: 1960        AGE: 56 y.o. SEX: male  Body mass index is 28.35 kg/m². PCP: SKYLER Nunez  10/27/20    Chief Complaint: Right shoulder follow up    HPI: Ganesh Lorenzo is a 56 y.o. male patient who returns to the office today for his right shoulder. He continues to have right shoulder pain. At his last visit in January he received an injection into her shoulder which did help some with the pain but he continues to have some pain. Mostly with overhead activity reaching lifting and carrying. Since his last visit he has had a hernia surgery as well. Past Medical History:   Diagnosis Date    Anxiety     Hypertension     Injury of right hand 11/19/2012    Deep lacerations, thenar eminence    Microscopic hematuria     Prostate cancer ([facility] [address]     treated with seeds 2010, followed by XRT after ADT - Dr. Weldon Cousin       Family History   Family history unknown: Yes       Current Outpatient Medications   Medication Sig Dispense Refill    ibuprofen (MOTRIN) 800 mg tablet Take 1 Tab by mouth three (3) times daily (with meals). 90 Tab 2    alfuzosin SR (UROXATRAL) 10 mg SR tablet Take 1 Tab by mouth daily (after dinner). 90 Tab 3    cholecalciferol, vitamin D3, (VITAMIN D3) 2,000 unit tab Take 2,000 Units by mouth nightly.  losartan (COZAAR) 100 mg tablet Take 100 mg by mouth nightly.  sertraline (ZOLOFT) 100 mg tablet Take  by mouth nightly.          Allergies   Allergen Reactions    Iodinated Contrast Media Hives    Iodine Not Reported This Time       Past Surgical History:   Procedure Laterality Date    HX CYST REMOVAL      back    HX HERNIA REPAIR      HX KNEE ARTHROSCOPY      HX OTHER SURGICAL  09/2010    repair fistula w/ fibrin glue    HX UROLOGICAL      Seed imnplant, radiation     HX UROLOGICAL  9/21/2015    [facility], Dr. Delmy Mccarty, Cystoscopy, Bilateral Retrograde, Biopsy of Bladder, Biopsy of Prostatic Urethra       Social History     Socioeconomic History    Marital status:      Spouse name: Not on file    Number of children: Not on file    Years of education: Not on file    Highest education level: Not on file   Occupational History    Not on file   Social Needs    Financial resource strain: Not on file    Food insecurity     Worry: Not on file     Inability: Not on file    Transportation needs     Medical: Not on file     Non-medical: Not on file   Tobacco Use    Smoking status: Never Smoker    Smokeless tobacco: Never Used   Substance and Sexual Activity    Alcohol use: Yes     Comment: Daily: 6 beers per day    Drug use: No    Sexual activity: Not on file   Lifestyle    Physical activity     Days per week: Not on file     Minutes per session: Not on file    Stress: Not on file   Relationships    Social connections     Talks on phone: Not on file     Gets together: Not on file     Attends Alevism service: Not on file     Active member of club or organization: Not on file     Attends meetings of clubs or organizations: Not on file     Relationship status: Not on file    Intimate partner violence     Fear of current or ex partner: Not on file     Emotionally abused: Not on file     Physically abused: Not on file     Forced sexual activity: Not on file   Other Topics Concern    Not on file   Social History Narrative    Not on file       REVIEW OF SYSTEMS:      No changes from previous review of systems unless noted. PHYSICAL EXAMINATION:  Visit Vitals  /75   Temp (!) 96.7 °F (35.9 °C)   Ht 6' (1.829 m)   Wt 209 lb (94.8 kg)   BMI 28.35 kg/m²     Body mass index is 28.35 kg/m². GENERAL: Alert and oriented x3, in no acute distress. HEENT: Normocephalic, atraumatic. RESP: Non labored breathing. SKIN: No rashes or lesions noted.    Shoulder Examination     R   L  ROM   FF  Full   Full  ER  Full   Full   IR  Full   Full  Rotator Cuff Pain   Supra  +   -   Infra  +   -   Subscap +   -  Crepitus  -   -  Effusion  -   -  Warmth  -   -   Erythema  -   -  Instability  -   -  AC Joint TTP  -   -  Clavicle   Deformity -   -   TTP  -   -  Proximal Humerus   Deformity -   -   TTP  -   -  Deltoid Strength 5   5  Biceps Strength 5   5  Biceps Deformity -   -  Biceps Groove Pain +   -  Impingement Sign -   -       IMAGING:  No new imaging today    ASSESSMENT & PLAN  Diagnosis: Right shoulder rotator cuff tear, biceps long head tendinopathy    Danette Valerio continues to have symptomatic right shoulder pain with a high-grade partial-thickness tear of his supraspinatus on his last MRI with 1 area that is concerning for a full-thickness tear. He also has biceps tendinopathy. I had a lengthy discussion with him today regarding the treatment options and at this point he would like to move forward with surgery in the form of an arthroscopic rotator cuff repair and biceps tenodesis. We will start the process again that set up. We discussed the surgery and recovery. He would like to move forward with it. The patient was counseled at length about the risks of franklin Covid-19 during their perioperative period and any recovery window from their procedure. The patient was made aware that franklin Covid-19  may worsen their prognosis for recovering from their procedure and lend to a higher morbidity and/or mortality risk. All material risks, benefits, and reasonable alternatives including postponing the procedure were discussed. The patient does  wish to proceed with the procedure at this time.             Electronically signed by: Sage Barkley MD

## 2020-11-25 DIAGNOSIS — S46.011D TRAUMATIC INCOMPLETE TEAR OF RIGHT ROTATOR CUFF, SUBSEQUENT ENCOUNTER: Primary | ICD-10-CM

## 2020-11-25 DIAGNOSIS — S46.101A INJURY OF TENDON OF LONG HEAD OF RIGHT BICEPS, INITIAL ENCOUNTER: ICD-10-CM

## 2021-12-09 RX ORDER — IBUPROFEN 800 MG/1
TABLET ORAL
Qty: 90 TABLET | Refills: 2 | Status: SHIPPED | OUTPATIENT
Start: 2021-12-09

## 2023-04-17 RX ORDER — IBUPROFEN 800 MG/1
TABLET ORAL
Qty: 90 TABLET | Refills: 2 | Status: SHIPPED | OUTPATIENT
Start: 2023-04-17

## 2024-09-10 ENCOUNTER — HOSPITAL ENCOUNTER (EMERGENCY)
Facility: HOSPITAL | Age: 64
Discharge: HOME | End: 2024-09-10
Attending: EMERGENCY MEDICINE
Payer: MEDICARE

## 2024-09-10 ENCOUNTER — APPOINTMENT (OUTPATIENT)
Facility: HOSPITAL | Age: 64
End: 2024-09-10
Payer: MEDICARE

## 2024-09-10 VITALS
WEIGHT: 215 LBS | SYSTOLIC BLOOD PRESSURE: 172 MMHG | BODY MASS INDEX: 29.12 KG/M2 | DIASTOLIC BLOOD PRESSURE: 83 MMHG | TEMPERATURE: 98.3 F | OXYGEN SATURATION: 97 % | RESPIRATION RATE: 18 BRPM | HEIGHT: 72 IN | HEART RATE: 78 BPM

## 2024-09-10 DIAGNOSIS — H60.391 INFECTIVE OTITIS EXTERNA OF RIGHT EAR: Primary | ICD-10-CM

## 2024-09-10 LAB
ANION GAP SERPL CALC-SCNC: 3 MMOL/L (ref 3–18)
BASOPHILS # BLD: 0.1 K/UL (ref 0–0.1)
BASOPHILS NFR BLD: 1 % (ref 0–2)
BUN SERPL-MCNC: 18 MG/DL (ref 7–18)
BUN/CREAT SERPL: 21 (ref 12–20)
CALCIUM SERPL-MCNC: 9.2 MG/DL (ref 8.5–10.1)
CHLORIDE SERPL-SCNC: 106 MMOL/L (ref 100–111)
CO2 SERPL-SCNC: 29 MMOL/L (ref 21–32)
CREAT SERPL-MCNC: 0.85 MG/DL (ref 0.6–1.3)
DIFFERENTIAL METHOD BLD: ABNORMAL
EOSINOPHIL # BLD: 0.1 K/UL (ref 0–0.4)
EOSINOPHIL NFR BLD: 1 % (ref 0–5)
ERYTHROCYTE [DISTWIDTH] IN BLOOD BY AUTOMATED COUNT: 11.9 % (ref 11.6–14.5)
GLUCOSE SERPL-MCNC: 126 MG/DL (ref 74–99)
HCT VFR BLD AUTO: 43.6 % (ref 36–48)
HGB BLD-MCNC: 14.5 G/DL (ref 13–16)
IMM GRANULOCYTES # BLD AUTO: 0.1 K/UL (ref 0–0.04)
IMM GRANULOCYTES NFR BLD AUTO: 1 % (ref 0–0.5)
LYMPHOCYTES # BLD: 1.1 K/UL (ref 0.9–3.6)
LYMPHOCYTES NFR BLD: 12 % (ref 21–52)
MCH RBC QN AUTO: 33 PG (ref 24–34)
MCHC RBC AUTO-ENTMCNC: 33.3 G/DL (ref 31–37)
MCV RBC AUTO: 99.1 FL (ref 78–100)
MONOCYTES # BLD: 0.5 K/UL (ref 0.05–1.2)
MONOCYTES NFR BLD: 6 % (ref 3–10)
NEUTS SEG # BLD: 7.5 K/UL (ref 1.8–8)
NEUTS SEG NFR BLD: 80 % (ref 40–73)
NRBC # BLD: 0 K/UL (ref 0–0.01)
NRBC BLD-RTO: 0 PER 100 WBC
PLATELET # BLD AUTO: 213 K/UL (ref 135–420)
PMV BLD AUTO: 9.3 FL (ref 9.2–11.8)
POTASSIUM SERPL-SCNC: 3.8 MMOL/L (ref 3.5–5.5)
RBC # BLD AUTO: 4.4 M/UL (ref 4.35–5.65)
SODIUM SERPL-SCNC: 138 MMOL/L (ref 136–145)
WBC # BLD AUTO: 9.4 K/UL (ref 4.6–13.2)

## 2024-09-10 PROCEDURE — 6360000002 HC RX W HCPCS: Performed by: PHYSICIAN ASSISTANT

## 2024-09-10 PROCEDURE — 99284 EMERGENCY DEPT VISIT MOD MDM: CPT

## 2024-09-10 PROCEDURE — 70480 CT ORBIT/EAR/FOSSA W/O DYE: CPT

## 2024-09-10 PROCEDURE — 80048 BASIC METABOLIC PNL TOTAL CA: CPT

## 2024-09-10 PROCEDURE — 87040 BLOOD CULTURE FOR BACTERIA: CPT

## 2024-09-10 PROCEDURE — 96365 THER/PROPH/DIAG IV INF INIT: CPT

## 2024-09-10 PROCEDURE — 85025 COMPLETE CBC W/AUTO DIFF WBC: CPT

## 2024-09-10 PROCEDURE — 6370000000 HC RX 637 (ALT 250 FOR IP): Performed by: PHYSICIAN ASSISTANT

## 2024-09-10 RX ORDER — DIPHENHYDRAMINE HCL 25 MG
50 CAPSULE ORAL ONCE
Status: DISCONTINUED | OUTPATIENT
Start: 2024-09-10 | End: 2024-09-10

## 2024-09-10 RX ORDER — HYDROCODONE BITARTRATE AND ACETAMINOPHEN 5; 325 MG/1; MG/1
1 TABLET ORAL
Status: COMPLETED | OUTPATIENT
Start: 2024-09-10 | End: 2024-09-10

## 2024-09-10 RX ORDER — NEOMYCIN SULFATE, POLYMYXIN B SULFATE, HYDROCORTISONE 3.5; 10000; 1 MG/ML; [USP'U]/ML; MG/ML
4 SOLUTION/ DROPS AURICULAR (OTIC) 4 TIMES DAILY
Qty: 10 ML | Refills: 0 | Status: SHIPPED | OUTPATIENT
Start: 2024-09-10 | End: 2024-09-20

## 2024-09-10 RX ORDER — HYDROCODONE BITARTRATE AND ACETAMINOPHEN 5; 325 MG/1; MG/1
1 TABLET ORAL EVERY 6 HOURS PRN
Qty: 5 TABLET | Refills: 0 | Status: SHIPPED | OUTPATIENT
Start: 2024-09-10 | End: 2024-09-13

## 2024-09-10 RX ORDER — CLINDAMYCIN PHOSPHATE 600 MG/50ML
600 INJECTION, SOLUTION INTRAVENOUS
Status: COMPLETED | OUTPATIENT
Start: 2024-09-10 | End: 2024-09-10

## 2024-09-10 RX ORDER — NEOMYCIN SULFATE, POLYMYXIN B SULFATE AND HYDROCORTISONE 10; 3.5; 1 MG/ML; MG/ML; [USP'U]/ML
2 SUSPENSION/ DROPS AURICULAR (OTIC)
Status: COMPLETED | OUTPATIENT
Start: 2024-09-10 | End: 2024-09-10

## 2024-09-10 RX ADMIN — CLINDAMYCIN PHOSPHATE 600 MG: 600 INJECTION, SOLUTION INTRAVENOUS at 09:33

## 2024-09-10 RX ADMIN — HYDROCODONE BITARTRATE AND ACETAMINOPHEN 1 TABLET: 5; 325 TABLET ORAL at 11:36

## 2024-09-10 RX ADMIN — NEOMYCIN SULFATE, POLYMYXIN B SULFATE AND HYDROCORTISONE 2 DROP: 10; 3.5; 1 SUSPENSION/ DROPS AURICULAR (OTIC) at 12:27

## 2024-09-10 ASSESSMENT — PAIN DESCRIPTION - LOCATION
LOCATION: EAR
LOCATION: EAR

## 2024-09-10 ASSESSMENT — PAIN DESCRIPTION - ORIENTATION
ORIENTATION: RIGHT
ORIENTATION: RIGHT

## 2024-09-10 ASSESSMENT — ENCOUNTER SYMPTOMS
VOMITING: 0
FACIAL SWELLING: 1
NAUSEA: 0
DIARRHEA: 0
SHORTNESS OF BREATH: 0
ABDOMINAL PAIN: 0

## 2024-09-10 ASSESSMENT — PAIN SCALES - GENERAL
PAINLEVEL_OUTOF10: 5
PAINLEVEL_OUTOF10: 6

## 2024-09-10 ASSESSMENT — PAIN - FUNCTIONAL ASSESSMENT: PAIN_FUNCTIONAL_ASSESSMENT: 0-10

## 2024-09-10 ASSESSMENT — PAIN DESCRIPTION - DESCRIPTORS: DESCRIPTORS: ACHING

## 2024-09-15 LAB
BACTERIA SPEC CULT: NORMAL
BACTERIA SPEC CULT: NORMAL
SERVICE CMNT-IMP: NORMAL
SERVICE CMNT-IMP: NORMAL

## 2025-05-15 ENCOUNTER — OFFICE VISIT (OUTPATIENT)
Age: 65
End: 2025-05-15

## 2025-05-15 DIAGNOSIS — M25.561 ACUTE PAIN OF RIGHT KNEE: Primary | ICD-10-CM

## 2025-05-15 DIAGNOSIS — M17.11 OSTEOARTHRITIS OF RIGHT KNEE, UNSPECIFIED OSTEOARTHRITIS TYPE: ICD-10-CM

## 2025-05-15 RX ORDER — TRIAMCINOLONE ACETONIDE 40 MG/ML
40 INJECTION, SUSPENSION INTRA-ARTICULAR; INTRAMUSCULAR ONCE
Status: COMPLETED | OUTPATIENT
Start: 2025-05-15 | End: 2025-05-15

## 2025-05-15 RX ADMIN — TRIAMCINOLONE ACETONIDE 40 MG: 40 INJECTION, SUSPENSION INTRA-ARTICULAR; INTRAMUSCULAR at 13:52

## 2025-05-15 NOTE — PROGRESS NOTES
Luke Clemens  1960   Chief Complaint   Patient presents with    Knee Pain     Right knee pain        HISTORY OF PRESENT ILLNESS  Luke Clemens is a 64 y.o. male who presents today for evaluation of right knee pain.  Pain is a 2/10. Pain has been present for years but has worsened since January due going skiing. He has soreness. He has a history of a knee scope and injections with benefit. Pain increases with prolonged activity .     Has tried following treatments: Injections:Yes; Brace:No; Therapy:No; Cane/Crutch:No      Allergies   Allergen Reactions    Iodinated Contrast Media Hives    Iodine      Other reaction(s): Not Reported This Time        Past Medical History:   Diagnosis Date    Anxiety     Hypertension     Injury of right hand 11/19/2012    Deep lacerations, thenar eminence    Microscopic hematuria     Prostate cancer ([facility])     treated with seeds 2010, followed by XRT after ADT - Dr. Haynes      Social History       Tobacco History       Smoking Status  Never      Smokeless Tobacco Use  Never              Alcohol History       Alcohol Use Status  Yes              Drug Use       Drug Use Status  No              Sexual Activity       Sexually Active  Not Asked                   Past Surgical History:   Procedure Laterality Date    CYST REMOVAL      back    HERNIA REPAIR      KNEE ARTHROSCOPY      OTHER SURGICAL HISTORY  09/2010    repair fistula w/ fibrin glue    UROLOGICAL SURGERY  9/21/2015    [facility], Dr. Mahin Santoyo, Cystoscopy, Bilateral Retrograde, Biopsy of Bladder, Biopsy of Prostatic Urethra    UROLOGICAL SURGERY      Seed imnplant, radiation       History reviewed. No pertinent family history.  Current Outpatient Medications   Medication Sig    ibuprofen (ADVIL;MOTRIN) 800 MG tablet take 1 tablet by mouth three times a day with meals    alfuzosin (UROXATRAL) 10 MG extended release tablet Take 10 mg by mouth    Cholecalciferol 50 MCG (2000 UT) TABS Take 2,000 Units

## 2025-05-16 ENCOUNTER — TRANSCRIBE ORDERS (OUTPATIENT)
Facility: HOSPITAL | Age: 65
End: 2025-05-16

## 2025-05-16 DIAGNOSIS — J32.0 CHRONIC MAXILLARY SINUSITIS: Primary | ICD-10-CM

## 2025-06-02 ENCOUNTER — HOSPITAL ENCOUNTER (OUTPATIENT)
Age: 65
Discharge: HOME | End: 2025-06-05
Payer: MEDICARE

## 2025-06-02 DIAGNOSIS — J32.0 CHRONIC MAXILLARY SINUSITIS: ICD-10-CM

## 2025-06-02 PROCEDURE — 70486 CT MAXILLOFACIAL W/O DYE: CPT

## (undated) DEVICE — FLEX ADVANTAGE 3000CC: Brand: FLEX ADVANTAGE

## (undated) DEVICE — Device

## (undated) DEVICE — TIP COVER ACCESSORY

## (undated) DEVICE — SEAL UNIV 5-8MM DISP BX/10 -- DA VINCI XI - SNGL USE

## (undated) DEVICE — STERILE POLYISOPRENE POWDER-FREE SURGICAL GLOVES: Brand: PROTEXIS

## (undated) DEVICE — ARM DRAPE

## (undated) DEVICE — INTENDED FOR TISSUE SEPARATION, AND OTHER PROCEDURES THAT REQUIRE A SHARP SURGICAL BLADE TO PUNCTURE OR CUT.: Brand: BARD-PARKER ® CARBON RIB-BACK BLADES

## (undated) DEVICE — BLADELESS OBTURATOR: Brand: WECK VISTA

## (undated) DEVICE — SYR 10ML LUER LOK 1/5ML GRAD --

## (undated) DEVICE — REM POLYHESIVE ADULT PATIENT RETURN ELECTRODE: Brand: VALLEYLAB

## (undated) DEVICE — COVER LT HNDL FLX

## (undated) DEVICE — ELECTRO LUBE IS A SINGLE PATIENT USE DEVICE THAT IS INTENDED TO BE USED ON ELECTROSURGICAL ELECTRODES TO REDUCE STICKING.: Brand: KEY SURGICAL ELECTRO LUBE

## (undated) DEVICE — KIT,ANTI FOG,W/SPONGE & FLUID,SOFT PACK: Brand: MEDLINE

## (undated) DEVICE — SOLUTION IV 1000ML 0.9% SOD CHL

## (undated) DEVICE — LIGHT HANDLE: Brand: DEVON

## (undated) DEVICE — BLANKET WRM AD W50XL85.8IN PACU FULL BODY FORC AIR

## (undated) DEVICE — APPLICATOR BNDG 1MM ADH PREMIERPRO EXOFIN

## (undated) DEVICE — MAYO STAND COVER: Brand: CONVERTORS

## (undated) DEVICE — INSUFFLATION NEEDLE TO ESTABLISH PNEUMOPERITONEUM.: Brand: INSUFFLATION NEEDLE

## (undated) DEVICE — INTENDED FOR TISSUE SEPARATION, AND OTHER PROCEDURES THAT REQUIRE A SHARP SURGICAL BLADE TO PUNCTURE OR CUT.: Brand: BARD-PARKER ®  SAFETY SCALPED

## (undated) DEVICE — SUTURE MCRYL SZ 4-0 L27IN ABSRB UD L24MM PS-1 3/8 CIR PRIM Y935H

## (undated) DEVICE — PREP SKN CHLRAPRP APL 26ML STR --

## (undated) DEVICE — GOWN,SIRUS,POLYRNF,SETINSLV,XL,20/CS: Brand: MEDLINE

## (undated) DEVICE — DRAPE TOWEL: Brand: CONVERTORS

## (undated) DEVICE — GARMENT,MEDLINE,DVT,INT,CALF,MED, GEN2: Brand: MEDLINE

## (undated) DEVICE — SUTURE VCRL SZ 2-0 L27IN ABSRB UD L26MM SH 1/2 CIR J417H

## (undated) DEVICE — COLUMN DRAPE

## (undated) DEVICE — CORD ES L10FT MPLR LAP

## (undated) DEVICE — SUTURE VLOC 90 2/0 VL 6 GS-22 VLOCM2105